# Patient Record
(demographics unavailable — no encounter records)

---

## 2017-02-21 NOTE — EDDOCDS
Physician Documentation                                                                           

[facility]                                                                         

Name: Josue Pang                                                                              

Age: 19 yrs                                                                                       

Sex: Male                                                                                         

: 1997                                                                                   

MRN: [id number]                                                                                     

Arrival Date: 2017                                                                          

Time: 09:28                                                                                       

Account#: [id number]                                                                              

Bed I7                                                                                        

Private MD: [facility] [place]                                                                          

Disposition:                                                                                      

17 12:35 Discharged to Home/Self Care. Impression: Calculus of kidney and ureter -          

Hematuria and Distal left ureteral stone on CT, Melena - None today, Nausea,                    

Generalized abdominal pain.                                                                     

- Condition is Stable.                                                                            

- Discharge Instructions: Nausea, Adult, Kidney Stones, Easy-to-Read, Gastrointestinal            

Bleeding, Easy-to-Read.                                                                         

- Prescriptions for Flomax 0.4 mg Oral Capsule, Sust. Release 24 hr - take 1 capsule by           

ORAL route once daily 1/2 hour following the same meal each day; 30 capsule. ZOFRAN             

ODT 4 mg - dissolve 1 tablet by ORAL route 4 times per day As needed do not chew, do            

not swallow whole; 10 tablet. Carafate 1 gram Oral Tablet - take 1 tablet by ORAL               

route 4 times per day take on an empty stomach, beginning on waking and last dose at            

bedtime; 100 tablet. Norco 5- 325 mg Oral Tablet - take 1 tablet by ORAL route every            

6 hours As needed MDD: 4 tabs; 20 tablet.                                                       

- Medication Reconciliation, Local Pharmacy Hours form.                                           

- Follow up: [facility] [place] [facility]; When: 1 - 2 days; Reason: Recheck today's complaints,                   

Continuance of care. Follow up: Emergency Department; Reason: Worsening of                      

conditions. Follow up: Johanna Díaz; When: Call to arrange an appointment; Reason:            

Further diagnostic work-up, Recheck today's complaints, Continuance of care. Follow             

up: Dr. Maged Saleem; When: Call to arrange an appointment; Reason: Further                 

diagnostic work-up, Recheck today's complaints, Continuance of care.                            

- Problem is new.                                                                                 

- Symptoms have improved.                                                                         

                                                                                                  

                                                                                                  

                                                                                                  

Historical:                                                                                       

- Allergies: Augmentin (Rash);                                                                    

- Home Meds:                                                                                      

1. promethazine 25 mg oral tab every 6 hours as needed                                          

2. Zantac 150 mg oral cap once daily                                                            

3. Sudafed 30 mg oral tab every 4-6 hours as needed                                             

4. Tylenol 325 mg oral tab 2 tabs every 4 hours as needed                                       

5. Zofran (as hydrochloride) 4 mg Oral tab 4 mg every 8 hours as needed                         

- PMHx: GERD;                                                                                     

- PSHx: none;                                                                                     

- Social history: Smoking status: Patient states was never smoker of tobacco. No                  

barriers to communication noted, The patient speaks fluent English.                             

- Family history: Not pertinent.                                                                  

- : The pt / caregiver states he / she is not on anticoagulants. Home medication list             

is obtained from the patient.                                                                   

- Exposure Risk Screening:: None identified.                                                      

                                                                                                  

                                                                                                  

Vital Signs:                                                                                      

                                                                                             

09:30  / 90; Pulse 88; Resp 16; Temp 96.4(T); Pulse Ox 100% on R/A; Weight 63.5 kg /    lr2 

      139.99 lbs (R); Height 5 ft. 7 in. (170.18 cm) (R); Pain 5/10;                              

12:49  / 70; Pulse 91; Resp 18; Temp 97.3(O); Pulse Ox 99% on R/A; Pain 4/10;           jml1

09:30 Body Mass Index 21.93 (63.50 kg, 170.18 cm)                                             lr2 

                                                                                                  

MDM:                                                                                              

10:07 NS 0.9% 1000 ml IV at bolus once ordered.                                               ef1 

10:07 Ondansetron 4 mg IVP once ordered.                                                      ef1 

10:07 ketorolac 30 mg IVP once ordered.                                                       ef1 

10:07 IV Saline Lock ordered.                                                                 ef1 

10:07 Undress patient appropriately for examination ordered.                                  ef1 

10:08 CT ABD & PELVIS: No Contrast Ordered.                                                   EDMS

10:09 Amylase Ordered.                                                                        EDMS

10:09 Basic Metabolic Profile Ordered.                                                        EDMS

10:09 CBC with Diff Ordered.                                                                  EDMS

10:09 Lipase Ordered.                                                                         EDMS

10:09 Liver Profile Ordered.                                                                  EDMS

10:09 Urinalysis Ordered.                                                                     EDMS

10:09 Urine Culture Ordered.                                                                  EDMS

10:09 NOTHING BY MOUTH+DIET ordered.                                                          EDMS

10:29 Financial registration complete.                                                        mm15

10:46 [facility] Payment Agreement was scanned into [employer] and attached to record.               mm15

11:17 Basic Metabolic Profile Reviewed.                                                       ef1 

11:17 CBC with Diff Reviewed.                                                                 ef1 

11:17 Liver Profile Reviewed.                                                                 ef1 

11:17 Urinalysis Reviewed.                                                                    ef1 

11:17 Amylase Reviewed.                                                                       ef1 

11:17 Lipase Reviewed.                                                                        ef1 

11:20 Gallbladder US Ordered.                                                                 EDMS

12:34 Tamsulosin Extended Release 24 hour Capsule 0.4 mg PO once ordered.                     ef1 

12:41 Ondansetron ODT Oral Disintegrating Tablet 4 mg PO once ordered.                        ef1 

12:41 HYDROcodone-acetaminophen 5 mg-325 mg 1 tabs PO once ordered.                           ef1 

                                                                                                  

Administered Medications:                                                                         

10:31 Drug: NS 0.9% 1000 ml [sodium chloride 0.9 % intravenous solution] Route: IV; Rate:     mk4 

      bolus; Site: right antecubital;                                                             

12:59 Follow up: IV Status: Completed infusion; IV Intake: 1000ml                             dsf 

10:31 Drug: Ondansetron 4 mg Route: IVP; Site: right antecubital;                             mk4 

12:01 Follow up: Response: No Adverse Reaction                                                hs1 

10:32 Drug: ketorolac 30 mg [ketorolac 30 mg/mL (1 mL) injection solution (1 mL)] Route: IVP; mk4 

      Site: right antecubital;                                                                    

12:01 Follow up: Response: Pain is decreased                                                  hs1 

12:57 Drug: Tamsulosin 0.4 mg [tamsulosin 0.4 mg capsule (1 caps)] Route: PO;                 dsf 

12:57 Drug: Ondansetron ODT 4 mg [ondansetron 4 mg disintegrating tablet (1 tabs)] Route: PO; dsf 

12:57 Drug: HYDROcodone-acetaminophen 1 tabs [hydrocodone 5 mg-acetaminophen 325 mg tablet (1 dsf 

      tabs)] Route: PO;                                                                           

                                                                                                  

                                                                                                  

Signatures:                                                                                       

Dispatcher [employer]                           Dipika Ma RN RN kcs Feola, Erica, PA-C                      PA-C ef1                                                  

Hattie Chance RN RN   dsf                                                  

Jaguar Gibson                             mm15                                                 

Constance Dowd RN                      RN   mk4                                                  

Rema Browne RN   hs1                                                  

                                                                                                  

The chart was reviewed and I authenticate all verbal orders and agree with the evaluation and 
treatment provided.Attachments:

10:46 [facility] Payment Agreement                                                                mm15

                                                                                                  

**************************************************************************************************

MTDD

## 2017-02-21 NOTE — EDDOCDS
Nurse's Notes                                                                                     

[facility]                                                                         

Name: Josue Pang                                                                              

Age: 19 yrs                                                                                       

Sex: Male                                                                                         

: 1997                                                                                   

MRN: [id number]                                                                                     

Arrival Date: 2017                                                                          

Time: 09:28                                                                                       

Account#: [id number]                                                                              

Bed I7 / 29                                                                                       

Private MD: KERRIE Gaston                                                                          

Diagnosis: Calculus of kidney and ureter-Hematuria and Distal left ureteral stone on              

CT;Melena-None today;Nausea;Generalized abdominal pain                                          

                                                                                                  

Presentation:                                                                                     

                                                                                             

09:45 Presenting complaint: Patient states: he has had abdominal pain since last Thursday -   kcs 

      went to sick call then - also had black stools then but he was just put on quarters and     

      thinks it tested negative for blood - still with abdominal pain plus now has extreme        

      fatigue, headache and his back hurts. Risk factors: the patient reports not having a        

      history of previous torsion. Adult Sepsis Screening: The patient does not have new or       

      worsening altered mentation. Patient's respiratory rate is less than 22. Systolic blood     

      pressure is greater than 100. Patient has a qSOFA score of 0- Negative Sepsis Screen.       

      Suicide/Homicide risk assessment- the patient denies having any suicidal and/or             

      homicidal ideations and does not present with any other emotional, behavioral or mental     

      health complaints.  Status: The patient is an active duty .           

      Transition of care: Patient was received from [facility].                   

09:45 Acuity: RICK Level 3                                                                     kcs 

09:45 Method Of Arrival: Walkin/Carried/Asstd                                                 kcs 

                                                                                                  

Triage Assessment:                                                                                

09:50 General: Appears comfortable, well developed, well nourished, well groomed, Behavior is kcs 

      cooperative, flat. Pain: Location: abdomen Pain currently is 5 out of 10 on a pain          

      scale. 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 Neurological: Level of         

      Consciousness is awake, alert. Respiratory: Airway is patent Respiratory effort is          

      even, unlabored, Respiratory pattern is regular, symmetrical. GI: Reports nausea,           

      Denies diarrhea, vomiting. Derm: Skin is intact, is healthy with good turgor, Skin is       

      dry, Skin is normal.                                                                        

                                                                                                  

Historical:                                                                                       

- Allergies: Augmentin (Rash);                                                                    

- Home Meds:                                                                                      

1. promethazine 25 mg oral tab every 6 hours as needed                                          

2. Zantac 150 mg oral cap once daily                                                            

3. Sudafed 30 mg oral tab every 4-6 hours as needed                                             

4. Tylenol 325 mg oral tab 2 tabs every 4 hours as needed                                       

5. Zofran (as hydrochloride) 4 mg Oral tab 4 mg every 8 hours as needed                         

- PMHx: GERD;                                                                                     

- PSHx: none;                                                                                     

- Social history: Smoking status: Patient states was never smoker of tobacco. No                  

barriers to communication noted, The patient speaks fluent English.                             

- Family history: Not pertinent.                                                                  

- : The pt / caregiver states he / she is not on anticoagulants. Home medication list             

is obtained from the patient.                                                                   

- Exposure Risk Screening:: None identified.                                                      

                                                                                                  

                                                                                                  

Screening:                                                                                        

10:10 Screening information is obtained from the patient. Fall risk: No risks identified.     mk4 

      Assistance ADL's: requires no assistance with activities of daily living. Abuse/DV          

      Screen: The patient / caregiver reports he/she is: not in a situation that causes fear,     

      pain or injury. Nutritional screening: No deficits noted. Advance Directives:               

      Currently, there is no health care proxy. There is no active DNR order. There is no         

      living will. There is no Power of . Advance directive information has not           

      previously been placed in an [facility] medical record. Further advance directive information      

      is declined. home support is adequate.                                                      

                                                                                                  

Assessment:                                                                                       

10:10 General: Appears uncomfortable, Behavior is cooperative. Pain: Location: right lower    mk4 

      quadrant and left lower quadrant. Neurological: Level of Consciousness is awake, alert.     

      Respiratory: Airway is patent Respiratory effort is even, unlabored, Respiratory            

      pattern is regular, Breath sounds are clear bilaterally. GI: Abdomen is flat, non-          

      distended Bowel sounds present X 4 quads. Abd is tender to palpation in right lower         

      quadrant and left lower quadrant. Derm: Skin is intact, is healthy with good turgor,        

      Skin is pink, warm & dry.                                                                 

12:02 General: Appears in no apparent distress, comfortable, Behavior is appropriate for age, hs1 

      cooperative, patient resting at this time with no needs. Patient states pain decreased      

      and that he is feeling better. .                                                            

12:58 General: Appears in no apparent distress, Behavior is appropriate for age, cooperative. dsf 

      Neurological: Level of Consciousness is awake, alert. Cardiovascular: Capillary refill      

      < 3 seconds. Respiratory: Airway is patent Respiratory effort is even, unlabored,           

      Respiratory pattern is regular, symmetrical. Derm: Skin is pink, warm & dry.              

                                                                                                  

Vital Signs:                                                                                      

09:30  / 90; Pulse 88; Resp 16; Temp 96.4(T); Pulse Ox 100% on R/A; Weight 63.5 kg (R); lr2 

      Height 5 ft. 7 in. (170.18 cm) (R); Pain 5/10;                                              

12:49  / 70; Pulse 91; Resp 18; Temp 97.3(O); Pulse Ox 99% on R/A; Pain 4/10;           jml1

09:30 Body Mass Index 21.93 (63.50 kg, 170.18 cm)                                             lr2 

                                                                                                  

Vitals:                                                                                           

09:30 Log In Time: 2017 at 09:28.                                                lr2 

                                                                                                  

ED Course:                                                                                        

09:30 Patient visited by Rochelle Connolly.                                                         lr2 

09:30 Patient moved to Waiting                                                                lr2 

09:31 SAUNDRA Gaston is Private Physician.                                                      lr2 

09:32 Patient moved to Pre RCE                                                                lr2 

09:37 [facility]                                                             ef1 

09:37 Chaparrita Shah MD is Attending Physician.                                      ef1 

09:47 Triage Initiated                                                                        kcs 

09:52 Patient visited by Jaycee Carrillo PA-C.                                                  ef1 

09:52 Patient moved to Triage 1                                                               kcs 

10:09 Patient moved to I1 / M1                                                                srm 

10:09 Patient moved to I7 / 29                                                                srm 

10:10 The patient / caregiver is instructed regarding the plan of care and ED course.         mk4 

10:10 Inserted saline lock: 20 gauge in right antecubital area and blood collected.           mk4 

10:28 Patient visited by Jaycee Carrillo PA-C.                                                  ef1 

10:46 [facility] Payment Agreement was scanned into [employer] and attached to record.               mm15

10:59 Patient name changed from Josue\S\\S\Pang\S\ to Josue\S\ \S\Pang.                      
   EDMS

11:02 Patient visited by Constance Dowd RN.                                                  mk4 

11:21 Patient visited by Jaycee Carrillo PA-C.                                                  ef1 

11:39 CT ABD & PELVIS: No Contrast Returned.                                                  EDMS

11:49 Patient visited by Jaycee Carrillo PA-C.                                                  ef1 

12:24 Patient visited by Constance Dowd RN.                                                  mk4 

12:25 Gallbladder US Returned.                                                                EDMS

12:35 Patient visited by Jaycee Carrillo PA-C.                                                  ef1 

12:35 KERRIE Gaston is Referral Physician.                                                     ef1 

12:35 Johanna Díaz MD is Referral Physician.                                              ef1 

12:35 Maged Saleem MD is Referral Physician.                                            ef1 

12:50 Patient visited by David John.                                                       jml1

12:58 Discontinued lock intact, bleeding controlled, pressure dressing applied, No            dsf 

      redness/swelling at site. No procedures done that require assistance.                       

                                                                                                  

Administered Medications:                                                                         

10:31 Drug: NS 0.9% 1000 ml [sodium chloride 0.9 % intravenous solution] Route: IV; Rate:     mk4 

      bolus; Site: right antecubital;                                                             

12:59 Follow up: IV Status: Completed infusion; IV Intake: 1000ml                             dsf 

10:31 Drug: Ondansetron 4 mg Route: IVP; Site: right antecubital;                             mk4 

12:01 Follow up: Response: No Adverse Reaction                                                hs1 

10:32 Drug: ketorolac 30 mg [ketorolac 30 mg/mL (1 mL) injection solution (1 mL)] Route: IVP; mk4 

      Site: right antecubital;                                                                    

12:01 Follow up: Response: Pain is decreased                                                  hs1 

12:57 Drug: Tamsulosin 0.4 mg [tamsulosin 0.4 mg capsule (1 caps)] Route: PO;                 dsf 

12:57 Drug: Ondansetron ODT 4 mg [ondansetron 4 mg disintegrating tablet (1 tabs)] Route: PO; dsf 

12:57 Drug: HYDROcodone-acetaminophen 1 tabs [hydrocodone 5 mg-acetaminophen 325 mg tablet (1 dsf 

      tabs)] Route: PO;                                                                           

                                                                                                  

                                                                                                  

Intake:                                                                                           

12:59 IV: 1000.00ml; Total: 1000.00ml.                                                        dsf 

                                                                                                  

Order Results:                                                                                    

Lab Order: Amylase; SPEC'M 17 10:22                                                         

      Test: AMYLASE; Value: 52; Range: ; Units: U/L; Status: F                              

Lab Order: Basic Metabolic Profile; SPEC'M 17 10:22                                         

      Test: GLUCOSE, FASTING; Value: 92; Range: ; Units: MG/DL; Status: F                   

      Test: BLOOD UREA NITROGEN; Value: 12; Range: 7-18; Units: MG/DL; Status: F                  

      Test: CREATININE FOR GFR; Value: 1.37; Range: 0.70-1.30; Abnormal: Above high normal;       

      Units: MG/DL; Status: F                                                                     

      Test: SODIUM LEVEL; Value: 141; Range: 136-145; Units: MEQ/L; Status: F                     

      Test: POTASSIUM SERUM; Value: 3.5; Range: 3.5-5.1; Units: MEQ/L; Status: F                  

      Test: CHLORIDE LEVEL; Value: 103; Range: ; Units: MEQ/L; Status: F                    

      Test: CARBON DIOXIDE LEVEL; Value: 30; Range: 21-32; Units: MEQ/L; Status: F                

      Test: ANION GAP; Value: 8; Range: 8-16; Units: MEQ/L; Status: F                             

      Test: CALCIUM LEVEL; Value: 9.2; Range: 8.5-10.1; Units: MG/DL; Status: F                   

Lab Order: CBC with Diff; SPEC'M 17 10:22                                                   

      Test: WHITE BLOOD COUNT; Value: 7.3; Range: 4.0-10.0; Units: K/mm3; Status: F               

      Test: RED BLOOD COUNT; Value: 6.07; Range: 4.30-6.10; Units: M/mm3; Status: F               

      Test: HEMOGLOBIN; Value: 18.2; Range: 14.0-18.0; Abnormal: Above high normal; Units:        

      g/dl; Status: F                                                                             

      Test: HEMATOCRIT; Value: 50.4; Range: 42.0-52.0; Units: %; Status: F                        

      Test: MEAN CORPUSCULAR VOLUME; Value: 83.0; Range: 80.0-96.0; Units: fl; Status: F          

      Test: MEAN CORPUSCULAR HEMOGLOBIN; Value: 29.9; Range: 27.0-33.0; Units: pg; Status: F      

      Test: MEAN CORPUSCULAR HGB CONC; Value: 36.0; Range: 32.0-36.5; Units: g/dl; Status: F      

      Test: RED CELL DISTRIBUTION WIDTH; Value: 12.0; Range: 11.5-14.5; Units: %; Status: F       

      Test: PLATELET COUNT, AUTOMATED; Value: 136; Range: 150-450; Abnormal: Below low            

      normal; Units: k/mm3; Status: F                                                             

      Test: NEUTROPHILS %; Value: 74.0; Range: 36.0-66.0; Abnormal: Above high normal; Units:     

      %; Status: F                                                                                

      Test: LYMPH %; Value: 16.9; Range: 24.0-44.0; Abnormal: Below low normal; Units: %;         

      Status: F                                                                                   

      Test: MONO %; Value: 6.0; Range: 0.0-5.0; Abnormal: Above high normal; Units: %;            

      Status: F                                                                                   

      Test: EOS %; Value: 0.7; Range: 0.0-3.0; Units: %; Status: F                                

      Test: BASO %; Value: 0.4; Range: 0.0-1.0; Units: %; Status: F                               

      Test: LARGE UNSTAINED CELL %; Value: 2.0; Range: 0.0-4.0; Units: %; Status: F               

      Test: NEUTROPHILS #; Value: 5.4; Range: 1.8-7.7; Units: K/mm3; Status: F                    

      Test: LYMPH #; Value: 1.2; Range: 1.5-6.5; Abnormal: Below low normal; Units: K/mm3;        

      Status: F                                                                                   

      Test: MONO #; Value: 0.4; Range: 0.0-0.8; Units: K/mm3; Status: F                           

      Test: EOS #; Value: 0.0; Range: 0.0-0.50; Units: K/mm3; Status: F                           

      Test: BASO #; Value: 0.0; Range: 0.0-0.2; Units: K/mm3; Status: F                           

      Test: LARGE UNSTAINED CELL #; Value: 0.2; Range: 0.0-0.4; Units: K/mm3; Status: F           

Lab Order: Lipase; SPEC'M 17 10:22                                                          

      Test: LIPASE; Value: 115; Range: ; Units: U/L; Status: F                              

Lab Order: Liver Profile; SPEC'M 17 10:22                                                   

      Test: AST/SGOT; Value: 16; Range: 15-37; Units: U/L; Status: F                              

      Test: ALT/SGPT; Value: 20; Range: 12-78; Units: U/L; Status: F                              

      Test: ALKALINE PHOSPHATASE; Value: 107; Range: ; Units: U/L; Status: F                

      Test: BILIRUBIN,TOTAL; Value: 2.6; Range: 0.2-1.0; Abnormal: Above high normal; Units:      

      MG/DL; Status: F                                                                            

      Test: BILIRUBIN,DIRECT; Value: 0.3; Range: 0.0-0.2; Abnormal: Above high normal; Units:     

      MG/DL; Status: F                                                                            

      Test: TOTAL PROTEIN; Value: 8.2; Range: 6.4-8.2; Units: GM/DL; Status: F                    

      Test: ALBUMIN; Value: 4.6; Range: 3.2-5.2; Units: GM/DL; Status: F                          

      Test: ALBUMIN/GLOBULIN RATIO; Value: 1.28; Range: 1.00-1.93; Status: F                      

Lab Order: Urinalysis; SPEC'M 17 10:22                                                      

      Test: APPEARANCE, URINE; Value: CLEAR; Range: CLEAR; Status: F                              

      Test: COLOR, URINE; Value: YELLOW; Range: YELLOW; Status: F                                 

      Test: PH,URINE; Value: 6.0; Range: 5.0-9.0; Units: UNITS; Status: F                         

      Test: SPECIFIC GRAVITY URINE AUTO; Value: 1.020; Range: 1.002-1.035; Status: F              

      Test: PROTEIN, URINE AUTO; Value: NEGATIVE; Range: NEGATIVE; Units: mg/dL; Status: F        

      Test: GLUCOSE, URINE (UA) AUTO; Value: NEGATIVE; Range: NEGATIVE; Units: mg/dL; Status:     

      F                                                                                           

      Test: KETONE, URINE AUTO; Value: NEGATIVE; Range: NEGATIVE; Units: mg/dL; Status: F         

      Test: UROBILINOGEN, URINE AUTO; Value: 0.2; Range: 0.0-2.0; Units: mg/dL; Status: F         

      Test: BILIRUBIN, URINE AUTO; Value: NEGATIVE; Range: NEGATIVE; Status: F                    

      Test: NITRITE, URINE AUTO; Value: NEGATIVE; Range: NEGATIVE; Status: F                      

      Test: LEUKOCYTE ESTERASE, URINE AUTO; Value: NEGATIVE; Range: NEGATIVE; Status: F           

      Test: BLOOD, URINE BLOOD; Value: 1+; Range: NEGATIVE; Abnormal: Above high normal;          

      Status: F                                                                                   

      Test: WBC, URINE AUTO; Value: 0; Range: 0-3; Units: /HPF; Status: F                         

      Test: RBC, URINE AUTO; Value: 4; Range: 0-3; Abnormal: Above high normal; Units: /HPF;      

      Status: F                                                                                   

      Test: BACTERIA, URINE AUTO; Value: NEGATIVE; Range: NEGATIVE; Status: F                     

      Test: SQUAMOUS EPITHELIAL CELL UR AU; Value: 0; Range: 0-6; Units: /HPF; Status: F          

      Test: MUCUS, URINE; Value: SMALL; Range: NEGATIVE; Status: F                                

      Test: HYALINE CAST, URINE AUTO; Value: 0; Range: 0-1; Units: /LPF; Status: F                

                                                                                                  

Radiology Order: CT ABD & PELVIS: No Contrast                                                   

      Test: CT ABD & PELVIS: No Contrast                                                        

      REASON FOR EXAMINATION: abd pain/black stools/flank pain; Clinical: Generalized             

      abdominal pain.; ; Findings:; Lung bases clear. Visualized heart and pericardium            

      normal.; ; Liver, spleen, pancreas, gallbladder, bilateral adrenal glands and kidneys       

      are; normal for noncontrast evaluation. Evaluation of the enteric system is limited; by     

      paucity of intraperitoneal fat as well as lack of intraluminal contrast. No;                

      obstruction or obvious acute inflammatory process. Visualized portions of the; appendix     

      appear normal although incompletely evaluated. Pelvis demonstrates; normal bladder and      

      age appropriate prostate/seminal vesicles. 3 mm calcification; within the deep left         

      bhakti pelvis likely represents phlebolith and less likely; ureteral calculus. No             

      ascites. No free air. No obvious adenopathy. Abdominal; aorta without aneurysm.             

      Musculoskeletal structures intact.; ; Impression:; Limited examination.; No obvious         

      acute intra-abdominal or pelvic pathology.; 3 mm calcification in the left bhakti pelvis      

      likely phleboliths although; correlation with physical examination and urinalysis is        

      recommended to better; exclude the possibility of distal left ureteral stone.; ; ;          

      Signed by; Camacho Avalos MD 2017 11:00 A;                                            

Radiology Order: Gallbladder US                                                                   

      Test: Gallbladder US                                                                        

      REASON FOR EXAMINATION: Biliary Colic; Clinical: Biliary colic.; ; Technique: Real time     

      gray scale and color evaluation using curved array; transducer.; ; Findings:; The liver     

      and pancreas are normal in contour, size, echogenicity without focal; hepatic or            

      pancreatic lesions identified. The gallbladder is normal and without; gallstones, wall      

      thickening or pericholecystic fluid. No biliary ductal; dilatation is appreciated and       

      the common bile duct measures 2.7 mm diameter. The; right kidney is normal in reniform      

      shape with extrarenal pelvis and measures 9.7; x 3.9 x 5.2 cm. No ascites in the            

      visualized right upper quadrant.; ; Impression:; Normal RUQ abdominal ultrasound.; ; ;      

      Signed by; Camacho Avalos MD 2017 12:04 P;                                            

Outcome:                                                                                          

12:35 Discharge ordered by Provider.                                                          ef1 

12:58 Discharge Assessment: Patient awake, alert and oriented x 3. No cognitive and/or        dsf 

      functional deficits noted. Patient verbalized understanding of disposition                  

      instructions. patient administered narcotics - yes. Pt provided with safe discharge.        

      The following High Risk Discharge criteria are identified: None. Discharged to home         

      ambulatory. Condition: stable. Discharge instructions given to patient, Instructed on       

      discharge instructions, follow up and referral plans. medication usage, no driving          

      heavy equipment, Demonstrated understanding of instructions, medications, Pt was            

      receptive of discharge instructions/ teaching. Prescriptions given X 3. CT Study            

      completed. Ultrasound Study completed. Property sent home with patient.                     

12:59 Patient left the ED.                                                                    dsf 

                                                                                                  

Signatures:                                                                                       

Dispatcher [employer]                           EDDipika Otero, RN                      RN   Lelo Mccullough RN                    RN   Jaycee Martin, PA-C                      PA-C ef1                                                  

Rema Browne RN                    RN   hs1                                                  

Hattie Chance,RN                       RN   dsf                                                  

David John                                jml1                                                 

Jaguar Gibson                             mm15                                                 

Constance Dowd RN                      RN   mk4                                                  

Rochelle Connolly                                  lr2                                                  

                                                                                                  

**************************************************************************************************

MTDD

## 2017-02-21 NOTE — REP
Clinical:  Generalized abdominal pain.

 

Findings:

Lung bases clear.  Visualized heart and pericardium normal.

 

Liver, spleen, pancreas, gallbladder, bilateral adrenal glands and kidneys are

normal for noncontrast evaluation.  Evaluation of the enteric system is limited

by paucity of intraperitoneal fat as well as lack of intraluminal contrast.  No

obstruction or obvious acute inflammatory process.  Visualized portions of the

appendix appear normal although incompletely evaluated.  Pelvis demonstrates

normal bladder and age appropriate prostate/seminal vesicles.  3 mm calcification

within the deep left bhakti pelvis likely represents phlebolith and less likely

ureteral calculus.  No ascites.  No free air.  No obvious adenopathy.  Abdominal

aorta without aneurysm.  Musculoskeletal structures intact.

 

Impression:

Limited examination.

No obvious acute intra-abdominal or pelvic pathology.

3 mm calcification in the left bhakti pelvis likely phleboliths although

correlation with physical examination and urinalysis is recommended to better

exclude the possibility of distal left ureteral stone.

 

 

Signed by

Camacho Avalos MD 02/21/2017 11:00 A

## 2017-02-21 NOTE — REP
Clinical:  Biliary colic.

 

Technique:  Real time gray scale and color evaluation using curved array

transducer.

 

Findings:

The liver and pancreas are normal in contour, size, echogenicity without focal

hepatic or pancreatic lesions identified.  The gallbladder is normal and without

gallstones, wall thickening or pericholecystic fluid.  No biliary ductal

dilatation is appreciated and the common bile duct measures 2.7 mm diameter.  The

right kidney is normal in reniform shape with extrarenal pelvis and measures 9.7

x 3.9 x 5.2 cm.  No ascites in the visualized right upper quadrant.

 

Impression:

Normal RUQ abdominal ultrasound.

 

 

Signed by

Camacho Avalos MD 02/21/2017 12:04 P

## 2017-02-23 NOTE — EDDOCDS
Physician Documentation                                                                           

[facility]                                                                         

Name: Josue Pang                                                                              

Age: 19 yrs                                                                                       

Sex: Male                                                                                         

: 1997                                                                                   

MRN: [id number]                                                                                     

Arrival Date: 2017                                                                          

Time: 09:28                                                                                       

Account#: [id number]                                                                              

Bed I7                                                                                        

Private MD: [facility]                                                                          

Disposition:                                                                                      

17 12:35 Discharged to Home/Self Care. Impression: Calculus of kidney and ureter -          

Hematuria and Distal left ureteral stone on CT, Melena - None today, Nausea,                    

Generalized abdominal pain.                                                                     

- Condition is Stable.                                                                            

- Discharge Instructions: Nausea, Adult, Kidney Stones, Easy-to-Read, Gastrointestinal            

Bleeding, Easy-to-Read.                                                                         

- Prescriptions for Flomax 0.4 mg Oral Capsule, Sust. Release 24 hr - take 1 capsule by           

ORAL route once daily 1/2 hour following the same meal each day; 30 capsule. ZOFRAN             

ODT 4 mg - dissolve 1 tablet by ORAL route 4 times per day As needed do not chew, do            

not swallow whole; 10 tablet. Carafate 1 gram Oral Tablet - take 1 tablet by ORAL               

route 4 times per day take on an empty stomach, beginning on waking and last dose at            

bedtime; 100 tablet. Norco 5- 325 mg Oral Tablet - take 1 tablet by ORAL route every            

6 hours As needed MDD: 4 tabs; 20 tablet.                                                       

- Medication Reconciliation, Local Pharmacy Hours form.                                           

- Follow up: [facility]; When: 1 - 2 days; Reason: Recheck today's complaints,                   

Continuance of care. Follow up: Emergency Department; Reason: Worsening of                      

conditions. Follow up: Johanna D[facility]az; When: Call to arrange an appointment; Reason:            

Further diagnostic work-up, Recheck today's complaints, Continuance of care. Follow             

up: Dr. Maged Saleem; When: Call to arrange an appointment; Reason: Further                 

diagnostic work-up, Recheck today's complaints, Continuance of care.                            

- Problem is new.                                                                                 

- Symptoms have improved.                                                                         

                                                                                                  

                                                                                                  

                                                                                                  

Historical:                                                                                       

- Allergies: Augmentin (Rash);                                                                    

- Home Meds:                                                                                      

1. promethazine 25 mg oral tab every 6 hours as needed                                          

2. Zantac 150 mg oral cap once daily                                                            

3. Sudafed 30 mg oral tab every 4-6 hours as needed                                             

4. Tylenol 325 mg oral tab 2 tabs every 4 hours as needed                                       

5. Zofran (as hydrochloride) 4 mg Oral tab 4 mg every 8 hours as needed                         

- PMHx: GERD;                                                                                     

- PSHx: none;                                                                                     

- Social history: Smoking status: Patient states was never smoker of tobacco. No                  

barriers to communication noted, The patient speaks fluent English.                             

- Family history: Not pertinent.                                                                  

- : The pt / caregiver states he / she is not on anticoagulants. Home medication list             

is obtained from the patient.                                                                   

- Exposure Risk Screening:: None identified.                                                      

                                                                                                  

                                                                                                  

Vital Signs:                                                                                      

                                                                                             

09:30  / 90; Pulse 88; Resp 16; Temp 96.4(T); Pulse Ox 100% on R/A; Weight 63.5 kg /    lr2 

      139.99 lbs (R); Height 5 ft. 7 in. (170.18 cm) (R); Pain 5/10;                              

12:49  / 70; Pulse 91; Resp 18; Temp 97.3(O); Pulse Ox 99% on R/A; Pain 4/10;           jml1

09:30 Body Mass Index 21.93 (63.50 kg, 170.18 cm)                                             lr2 

                                                                                                  

MDM:                                                                                              

10:07 NS 0.9% 1000 ml IV at bolus once ordered.                                               ef1 

10:07 Ondansetron 4 mg IVP once ordered.                                                      ef1 

10:07 ketorolac 30 mg IVP once ordered.                                                       ef1 

10:07 IV Saline Lock ordered.                                                                 ef1 

10:07 Undress patient appropriately for examination ordered.                                  ef1 

10:08 CT ABD & PELVIS: No Contrast Ordered.                                                   EDMS

10:09 Amylase Ordered.                                                                        EDMS

10:09 Basic Metabolic Profile Ordered.                                                        EDMS

10:09 CBC with Diff Ordered.                                                                  EDMS

10:09 Lipase Ordered.                                                                         EDMS

10:09 Liver Profile Ordered.                                                                  EDMS

10:09 Urinalysis Ordered.                                                                     EDMS

10:09 Urine Culture Ordered.                                                                  EDMS

10:09 NOTHING BY MOUTH+DIET ordered.                                                          EDMS

10:29 Financial registration complete.                                                        mm15

10:46 [facility] Payment Agreement was scanned into [employer] and attached to record.               mm15

11:17 Basic Metabolic Profile Reviewed.                                                       ef1 

11:17 CBC with Diff Reviewed.                                                                 ef1 

11:17 Liver Profile Reviewed.                                                                 ef1 

11:17 Urinalysis Reviewed.                                                                    ef1 

11:17 Amylase Reviewed.                                                                       ef1 

11:17 Lipase Reviewed.                                                                        ef1 

11:20 Gallbladder US Ordered.                                                                 EDMS

12:34 Tamsulosin Extended Release 24 hour Capsule 0.4 mg PO once ordered.                     ef1 

12:41 Ondansetron ODT Oral Disintegrating Tablet 4 mg PO once ordered.                        ef1 

12:41 HYDROcodone-acetaminophen 5 mg-325 mg 1 tabs PO once ordered.                           ef1 

                                                                                             

11:20 T-Sheet-- Draft Copy was scanned into [employer] and attached to record.                   gb  

11:21 Radiology Report was scanned into [employer] and attached to record.                       gb  

                                                                                                  

Administered Medications:                                                                         

                                                                                             

10:31 Drug: NS 0.9% 1000 ml [sodium chloride 0.9 % intravenous solution] Route: IV; Rate:     mk4 

      bolus; Site: right antecubital;                                                             

12:59 Follow up: IV Status: Completed infusion; IV Intake: 1000ml                             dsf 

10:31 Drug: Ondansetron 4 mg Route: IVP; Site: right antecubital;                             mk4 

12:01 Follow up: Response: No Adverse Reaction                                                hs1 

10:32 Drug: ketorolac 30 mg [ketorolac 30 mg/mL (1 mL) injection solution (1 mL)] Route: IVP; mk4 

      Site: right antecubital;                                                                    

12:01 Follow up: Response: Pain is decreased                                                  hs1 

12:57 Drug: Tamsulosin 0.4 mg [tamsulosin 0.4 mg capsule (1 caps)] Route: PO;                 dsf 

12:57 Drug: Ondansetron ODT 4 mg [ondansetron 4 mg disintegrating tablet (1 tabs)] Route: PO; dsf 

12:57 Drug: HYDROcodone-acetaminophen 1 tabs [hydrocodone 5 mg-acetaminophen 325 mg tablet (1 dsf 

      tabs)] Route: PO;                                                                           

                                                                                                  

                                                                                                  

Signatures:                                                                                       

Dispatcher [employer]                           Dipika Ma RN                      RN   kcs                                                  

Audrey Stoddard, Reg                  Reg  gb                                                   

Jaycee Carrillo, PA-C                      PA-C ef1                                                  

Hattie Chance RN                       RN   dsf                                                  

Jaguar Gibson                             mm15                                                 

Constance Dowd RN RN   mk4                                                  

Rema Browne RN   hs1                                                  

                                                                                                  

The chart was reviewed and I authenticate all verbal orders and agree with the evaluation and 
treatment provided.Attachments:

10:46 [facility] Payment Agreement                                                                mm15

                                                                                             

11:20 T-Sheet-- Draft Copy                                                                      

                                                                                                  

**************************************************************************************************



*** Chart Complete ***
MTDD

## 2017-02-23 NOTE — EDDOCDS
Nurse's Notes                                                                                     

[facility]                                                                         

Name: Josue Pang                                                                              

Age: 19 yrs                                                                                       

Sex: Male                                                                                         

: 1997                                                                                   

MRN: [id number]                                                                                     

Arrival Date: 2017                                                                          

Time: 09:28                                                                                       

Account#: [id number]                                                                              

Bed I7 / 29                                                                                       

Private MD: KERRIE Gaston                                                                          

Diagnosis: Calculus of kidney and ureter-Hematuria and Distal left ureteral stone on              

CT;Melena-None today;Nausea;Generalized abdominal pain                                          

                                                                                                  

Presentation:                                                                                     

                                                                                             

09:45 Presenting complaint: Patient states: he has had abdominal pain since last Thursday -   kcs 

      went to sick call then - also had black stools then but he was just put on quarters and     

      thinks it tested negative for blood - still with abdominal pain plus now has extreme        

      fatigue, headache and his back hurts. Risk factors: the patient reports not having a        

      history of previous torsion. Adult Sepsis Screening: The patient does not have new or       

      worsening altered mentation. Patient's respiratory rate is less than 22. Systolic blood     

      pressure is greater than 100. Patient has a qSOFA score of 0- Negative Sepsis Screen.       

      Suicide/Homicide risk assessment- the patient denies having any suicidal and/or             

      homicidal ideations and does not present with any other emotional, behavioral or mental     

      health complaints.  Status: The patient is an active duty .           

      Transition of care: Patient was received from [facility].                   

09:45 Acuity: RICK Level 3                                                                     kcs 

09:45 Method Of Arrival: Walkin/Carried/Asstd                                                 kcs 

                                                                                                  

Triage Assessment:                                                                                

09:50 General: Appears comfortable, well developed, well nourished, well groomed, Behavior is kcs 

      cooperative, flat. Pain: Location: abdomen Pain currently is 5 out of 10 on a pain          

      scale. 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 Neurological: Level of         

      Consciousness is awake, alert. Respiratory: Airway is patent Respiratory effort is          

      even, unlabored, Respiratory pattern is regular, symmetrical. GI: Reports nausea,           

      Denies diarrhea, vomiting. Derm: Skin is intact, is healthy with good turgor, Skin is       

      dry, Skin is normal.                                                                        

                                                                                                  

Historical:                                                                                       

- Allergies: Augmentin (Rash);                                                                    

- Home Meds:                                                                                      

1. promethazine 25 mg oral tab every 6 hours as needed                                          

2. Zantac 150 mg oral cap once daily                                                            

3. Sudafed 30 mg oral tab every 4-6 hours as needed                                             

4. Tylenol 325 mg oral tab 2 tabs every 4 hours as needed                                       

5. Zofran (as hydrochloride) 4 mg Oral tab 4 mg every 8 hours as needed                         

- PMHx: GERD;                                                                                     

- PSHx: none;                                                                                     

- Social history: Smoking status: Patient states was never smoker of tobacco. No                  

barriers to communication noted, The patient speaks fluent English.                             

- Family history: Not pertinent.                                                                  

- : The pt / caregiver states he / she is not on anticoagulants. Home medication list             

is obtained from the patient.                                                                   

- Exposure Risk Screening:: None identified.                                                      

                                                                                                  

                                                                                                  

Screening:                                                                                        

10:10 Screening information is obtained from the patient. Fall risk: No risks identified.     mk4 

      Assistance ADL's: requires no assistance with activities of daily living. Abuse/DV          

      Screen: The patient / caregiver reports he/she is: not in a situation that causes fear,     

      pain or injury. Nutritional screening: No deficits noted. Advance Directives:               

      Currently, there is no health care proxy. There is no active DNR order. There is no         

      living will. There is no Power of . Advance directive information has not           

      previously been placed in an [facility] medical record. Further advance directive information      

      is declined. home support is adequate.                                                      

                                                                                                  

Assessment:                                                                                       

10:10 General: Appears uncomfortable, Behavior is cooperative. Pain: Location: right lower    mk4 

      quadrant and left lower quadrant. Neurological: Level of Consciousness is awake, alert.     

      Respiratory: Airway is patent Respiratory effort is even, unlabored, Respiratory            

      pattern is regular, Breath sounds are clear bilaterally. GI: Abdomen is flat, non-          

      distended Bowel sounds present X 4 quads. Abd is tender to palpation in right lower         

      quadrant and left lower quadrant. Derm: Skin is intact, is healthy with good turgor,        

      Skin is pink, warm & dry.                                                                 

12:02 General: Appears in no apparent distress, comfortable, Behavior is appropriate for age, hs1 

      cooperative, patient resting at this time with no needs. Patient states pain decreased      

      and that he is feeling better. .                                                            

12:58 General: Appears in no apparent distress, Behavior is appropriate for age, cooperative. dsf 

      Neurological: Level of Consciousness is awake, alert. Cardiovascular: Capillary refill      

      < 3 seconds. Respiratory: Airway is patent Respiratory effort is even, unlabored,           

      Respiratory pattern is regular, symmetrical. Derm: Skin is pink, warm & dry.              

                                                                                                  

Vital Signs:                                                                                      

09:30  / 90; Pulse 88; Resp 16; Temp 96.4(T); Pulse Ox 100% on R/A; Weight 63.5 kg (R); lr2 

      Height 5 ft. 7 in. (170.18 cm) (R); Pain 5/10;                                              

12:49  / 70; Pulse 91; Resp 18; Temp 97.3(O); Pulse Ox 99% on R/A; Pain 4/10;           jml1

09:30 Body Mass Index 21.93 (63.50 kg, 170.18 cm)                                             lr2 

                                                                                                  

Vitals:                                                                                           

09:30 Log In Time: 2017 at 09:28.                                                lr2 

                                                                                                  

ED Course:                                                                                        

09:30 Patient visited by Rochelle Connolly.                                                         lr2 

09:30 Patient moved to Waiting                                                                lr2 

09:31 SAUNDRA Gaston is Private Physician.                                                      lr2 

09:32 Patient moved to Pre RCE                                                                lr2 

09:37 [facility]                                                             ef1 

09:37 Chaparrita Shah MD is Attending Physician.                                      ef1 

09:47 Triage Initiated                                                                        kcs 

09:52 Patient visited by Jaycee Carrillo PA-C.                                                  ef1 

09:52 Patient moved to Triage 1                                                               kcs 

10:09 Patient moved to I1 / M1                                                                srm 

10:09 Patient moved to I7 / 29                                                                srm 

10:10 The patient / caregiver is instructed regarding the plan of care and ED course.         mk4 

10:10 Inserted saline lock: 20 gauge in right antecubital area and blood collected.           mk4 

10:28 Patient visited by Jaycee Carrillo PA-C.                                                  ef1 

10:46 [facility] Payment Agreement was scanned into [employer] and attached to record.               mm15

10:59 Patient name changed from Josue\S\\S\Pang\S\ to Josue\S\ \S\Pang.                      
   EDMS

11:02 Patient visited by Constance Dowd RN.                                                  mk4 

11:21 Patient visited by Jaycee Carrillo PA-C.                                                  ef1 

11:39 CT ABD & PELVIS: No Contrast Returned.                                                  EDMS

11:49 Patient visited by Jaycee Carrillo PA-C.                                                  ef1 

12:24 Patient visited by Constance Dowd RN.                                                  mk4 

12:25 Gallbladder US Returned.                                                                EDMS

12:35 Patient visited by Jaycee Carrillo PA-C.                                                  ef1 

12:35 KERRIE Gaston is Referral Physician.                                                     ef1 

12:35 Johanna Díaz MD is Referral Physician.                                              ef1 

12:35 Maged Saleem MD is Referral Physician.                                            ef1 

12:50 Patient visited by David John.                                                       jml1

12:58 Discontinued lock intact, bleeding controlled, pressure dressing applied, No            dsf 

      redness/swelling at site. No procedures done that require assistance.                       

                                                                                             

11:20 T-Sheet-- Draft Copy was scanned into [employer] and attached to record.                   gb  

11:21 Radiology Report was scanned into [employer] and attached to record.                       gb  

                                                                                                  

Administered Medications:                                                                         

                                                                                             

10:31 Drug: NS 0.9% 1000 ml [sodium chloride 0.9 % intravenous solution] Route: IV; Rate:     mk4 

      bolus; Site: right antecubital;                                                             

12:59 Follow up: IV Status: Completed infusion; IV Intake: 1000ml                             dsf 

10:31 Drug: Ondansetron 4 mg Route: IVP; Site: right antecubital;                             mk4 

12:01 Follow up: Response: No Adverse Reaction                                                hs1 

10:32 Drug: ketorolac 30 mg [ketorolac 30 mg/mL (1 mL) injection solution (1 mL)] Route: IVP; mk4 

      Site: right antecubital;                                                                    

12:01 Follow up: Response: Pain is decreased                                                  hs1 

12:57 Drug: Tamsulosin 0.4 mg [tamsulosin 0.4 mg capsule (1 caps)] Route: PO;                 dsf 

12:57 Drug: Ondansetron ODT 4 mg [ondansetron 4 mg disintegrating tablet (1 tabs)] Route: PO; dsf 

12:57 Drug: HYDROcodone-acetaminophen 1 tabs [hydrocodone 5 mg-acetaminophen 325 mg tablet (1 dsf 

      tabs)] Route: PO;                                                                           

                                                                                                  

                                                                                                  

Intake:                                                                                           

12:59 IV: 1000.00ml; Total: 1000.00ml.                                                        dsf 

                                                                                                  

Order Results:                                                                                    

Lab Order: Amylase; SPEC'M 17 10:22                                                         

      Test: AMYLASE; Value: 52; Range: ; Units: U/L; Status: F                              

Lab Order: Basic Metabolic Profile; SPEC'M 17 10:22                                         

      Test: GLUCOSE, FASTING; Value: 92; Range: ; Units: MG/DL; Status: F                   

      Test: BLOOD UREA NITROGEN; Value: 12; Range: 7-18; Units: MG/DL; Status: F                  

      Test: CREATININE FOR GFR; Value: 1.37; Range: 0.70-1.30; Abnormal: Above high normal;       

      Units: MG/DL; Status: F                                                                     

      Test: SODIUM LEVEL; Value: 141; Range: 136-145; Units: MEQ/L; Status: F                     

      Test: POTASSIUM SERUM; Value: 3.5; Range: 3.5-5.1; Units: MEQ/L; Status: F                  

      Test: CHLORIDE LEVEL; Value: 103; Range: ; Units: MEQ/L; Status: F                    

      Test: CARBON DIOXIDE LEVEL; Value: 30; Range: 21-32; Units: MEQ/L; Status: F                

      Test: ANION GAP; Value: 8; Range: 8-16; Units: MEQ/L; Status: F                             

      Test: CALCIUM LEVEL; Value: 9.2; Range: 8.5-10.1; Units: MG/DL; Status: F                   

Lab Order: CBC with Diff; SPEC'M 17 10:22                                                   

      Test: WHITE BLOOD COUNT; Value: 7.3; Range: 4.0-10.0; Units: K/mm3; Status: F               

      Test: RED BLOOD COUNT; Value: 6.07; Range: 4.30-6.10; Units: M/mm3; Status: F               

      Test: HEMOGLOBIN; Value: 18.2; Range: 14.0-18.0; Abnormal: Above high normal; Units:        

      g/dl; Status: F                                                                             

      Test: HEMATOCRIT; Value: 50.4; Range: 42.0-52.0; Units: %; Status: F                        

      Test: MEAN CORPUSCULAR VOLUME; Value: 83.0; Range: 80.0-96.0; Units: fl; Status: F          

      Test: MEAN CORPUSCULAR HEMOGLOBIN; Value: 29.9; Range: 27.0-33.0; Units: pg; Status: F      

      Test: MEAN CORPUSCULAR HGB CONC; Value: 36.0; Range: 32.0-36.5; Units: g/dl; Status: F      

      Test: RED CELL DISTRIBUTION WIDTH; Value: 12.0; Range: 11.5-14.5; Units: %; Status: F       

      Test: PLATELET COUNT, AUTOMATED; Value: 136; Range: 150-450; Abnormal: Below low            

      normal; Units: k/mm3; Status: F                                                             

      Test: NEUTROPHILS %; Value: 74.0; Range: 36.0-66.0; Abnormal: Above high normal; Units:     

      %; Status: F                                                                                

      Test: LYMPH %; Value: 16.9; Range: 24.0-44.0; Abnormal: Below low normal; Units: %;         

      Status: F                                                                                   

      Test: MONO %; Value: 6.0; Range: 0.0-5.0; Abnormal: Above high normal; Units: %;            

      Status: F                                                                                   

      Test: EOS %; Value: 0.7; Range: 0.0-3.0; Units: %; Status: F                                

      Test: BASO %; Value: 0.4; Range: 0.0-1.0; Units: %; Status: F                               

      Test: LARGE UNSTAINED CELL %; Value: 2.0; Range: 0.0-4.0; Units: %; Status: F               

      Test: NEUTROPHILS #; Value: 5.4; Range: 1.8-7.7; Units: K/mm3; Status: F                    

      Test: LYMPH #; Value: 1.2; Range: 1.5-6.5; Abnormal: Below low normal; Units: K/mm3;        

      Status: F                                                                                   

      Test: MONO #; Value: 0.4; Range: 0.0-0.8; Units: K/mm3; Status: F                           

      Test: EOS #; Value: 0.0; Range: 0.0-0.50; Units: K/mm3; Status: F                           

      Test: BASO #; Value: 0.0; Range: 0.0-0.2; Units: K/mm3; Status: F                           

      Test: LARGE UNSTAINED CELL #; Value: 0.2; Range: 0.0-0.4; Units: K/mm3; Status: F           

Lab Order: Lipase; SPEC'M 17 10:22                                                          

      Test: LIPASE; Value: 115; Range: ; Units: U/L; Status: F                              

Lab Order: Liver Profile; SPEC'M 17 10:22                                                   

      Test: AST/SGOT; Value: 16; Range: 15-37; Units: U/L; Status: F                              

      Test: ALT/SGPT; Value: 20; Range: 12-78; Units: U/L; Status: F                              

      Test: ALKALINE PHOSPHATASE; Value: 107; Range: ; Units: U/L; Status: F                

      Test: BILIRUBIN,TOTAL; Value: 2.6; Range: 0.2-1.0; Abnormal: Above high normal; Units:      

      MG/DL; Status: F                                                                            

      Test: BILIRUBIN,DIRECT; Value: 0.3; Range: 0.0-0.2; Abnormal: Above high normal; Units:     

      MG/DL; Status: F                                                                            

      Test: TOTAL PROTEIN; Value: 8.2; Range: 6.4-8.2; Units: GM/DL; Status: F                    

      Test: ALBUMIN; Value: 4.6; Range: 3.2-5.2; Units: GM/DL; Status: F                          

      Test: ALBUMIN/GLOBULIN RATIO; Value: 1.28; Range: 1.00-1.93; Status: F                      

Lab Order: Urinalysis; SPEC'M 17 10:22                                                      

      Test: APPEARANCE, URINE; Value: CLEAR; Range: CLEAR; Status: F                              

      Test: COLOR, URINE; Value: YELLOW; Range: YELLOW; Status: F                                 

      Test: PH,URINE; Value: 6.0; Range: 5.0-9.0; Units: UNITS; Status: F                         

      Test: SPECIFIC GRAVITY URINE AUTO; Value: 1.020; Range: 1.002-1.035; Status: F              

      Test: PROTEIN, URINE AUTO; Value: NEGATIVE; Range: NEGATIVE; Units: mg/dL; Status: F        

      Test: GLUCOSE, URINE (UA) AUTO; Value: NEGATIVE; Range: NEGATIVE; Units: mg/dL; Status:     

      F                                                                                           

      Test: KETONE, URINE AUTO; Value: NEGATIVE; Range: NEGATIVE; Units: mg/dL; Status: F         

      Test: UROBILINOGEN, URINE AUTO; Value: 0.2; Range: 0.0-2.0; Units: mg/dL; Status: F         

      Test: BILIRUBIN, URINE AUTO; Value: NEGATIVE; Range: NEGATIVE; Status: F                    

      Test: NITRITE, URINE AUTO; Value: NEGATIVE; Range: NEGATIVE; Status: F                      

      Test: LEUKOCYTE ESTERASE, URINE AUTO; Value: NEGATIVE; Range: NEGATIVE; Status: F           

      Test: BLOOD, URINE BLOOD; Value: 1+; Range: NEGATIVE; Abnormal: Above high normal;          

      Status: F                                                                                   

      Test: WBC, URINE AUTO; Value: 0; Range: 0-3; Units: /HPF; Status: F                         

      Test: RBC, URINE AUTO; Value: 4; Range: 0-3; Abnormal: Above high normal; Units: /HPF;      

      Status: F                                                                                   

      Test: BACTERIA, URINE AUTO; Value: NEGATIVE; Range: NEGATIVE; Status: F                     

      Test: SQUAMOUS EPITHELIAL CELL UR AU; Value: 0; Range: 0-6; Units: /HPF; Status: F          

      Test: MUCUS, URINE; Value: SMALL; Range: NEGATIVE; Status: F                                

      Test: HYALINE CAST, URINE AUTO; Value: 0; Range: 0-1; Units: /LPF; Status: F                

Lab Order: Urine Culture; SPEC'M 17 10:22                                                   

      Test: URINE CULTURE; Value: <EXTERNAL COMMENT eCWMed> FULL REPORT IN LAB NOTES (eCW and     

      Med[facility]).; Status: F                                                                         

      Test: URINE CULTURE; Value: URINE CULTURE RESULT NO GROWTH CLINICAL SIGNIFICANCE 1          

      ORGANISM; Status: F                                                                         

                                                                                                  

Radiology Order: CT ABD & PELVIS: No Contrast                                                   

      Test: CT ABD & PELVIS: No Contrast                                                        

      REASON FOR EXAMINATION: abd pain/black stools/flank pain; Clinical: Generalized             

      abdominal pain.; ; Findings:; Lung bases clear. Visualized heart and pericardium            

      normal.; ; Liver, spleen, pancreas, gallbladder, bilateral adrenal glands and kidneys       

      are; normal for noncontrast evaluation. Evaluation of the enteric system is limited; by     

      paucity of intraperitoneal fat as well as lack of intraluminal contrast. No;                

      obstruction or obvious acute inflammatory process. Visualized portions of the; appendix     

      appear normal although incompletely evaluated. Pelvis demonstrates; normal bladder and      

      age appropriate prostate/seminal vesicles. 3 mm calcification; within the deep left         

      bhakti pelvis likely represents phlebolith and less likely; ureteral calculus. No             

      ascites. No free air. No obvious adenopathy. Abdominal; aorta without aneurysm.             

      Musculoskeletal structures intact.; ; Impression:; Limited examination.; No obvious         

      acute intra-abdominal or pelvic pathology.; 3 mm calcification in the left bhakti pelvis      

      likely phleboliths although; correlation with physical examination and urinalysis is        

      recommended to better; exclude the possibility of distal left ureteral stone.; ; ;          

      Signed by; Camacho Avalos MD 2017 11:00 A;                                            

Radiology Order: Gallbladder US                                                                   

      Test: Gallbladder US                                                                        

      REASON FOR EXAMINATION: Biliary Colic; Clinical: Biliary colic.; ; Technique: Real time     

      gray scale and color evaluation using curved array; transducer.; ; Findings:; The liver     

      and pancreas are normal in contour, size, echogenicity without focal; hepatic or            

      pancreatic lesions identified. The gallbladder is normal and without; gallstones, wall      

      thickening or pericholecystic fluid. No biliary ductal; dilatation is appreciated and       

      the common bile duct measures 2.7 mm diameter. The; right kidney is normal in reniform      

      shape with extrarenal pelvis and measures 9.7; x 3.9 x 5.2 cm. No ascites in the            

      visualized right upper quadrant.; ; Impression:; Normal RUQ abdominal ultrasound.; ; ;      

      Signed by; Camacho Avalos MD 2017 12:04 P;                                            

Outcome:                                                                                          

12:35 Discharge ordered by Provider.                                                          ef1 

12:58 Discharge Assessment: Patient awake, alert and oriented x 3. No cognitive and/or        dsf 

      functional deficits noted. Patient verbalized understanding of disposition                  

      instructions. patient administered narcotics - yes. Pt provided with safe discharge.        

      The following High Risk Discharge criteria are identified: None. Discharged to home         

      ambulatory. Condition: stable. Discharge instructions given to patient, Instructed on       

      discharge instructions, follow up and referral plans. medication usage, no driving          

      heavy equipment, Demonstrated understanding of instructions, medications, Pt was            

      receptive of discharge instructions/ teaching. Prescriptions given X 3. CT Study            

      completed. Ultrasound Study completed. Property sent home with patient.                     

12:59 Patient left the ED.                                                                    dsf 

                                                                                                  

Signatures:                                                                                       

Dispatcher [employer]                           EDDipika Otero, RN                      TRICIA   [facility]                                                  

Lelo Montoya RN RN   [facility]                                                  

Arlyn, Audrey, Reg                  Reg  gb                                                   

Jaycee Carrillo, PA-C                      PA-C ef1                                                  

Rema Browne RN                    RN   hs1                                                  

Hattie Chance RN                       RN   dsf                                                  

David John                                jml1                                                 

Jaguar Gibson                             mm15                                                 

Constance Dowd RN                      RN   [id number]                                                  

Rochelle Connolly                                  lr2                                                  

                                                                                                  

**************************************************************************************************



*** Chart Complete ***
MTDD

## 2017-02-23 NOTE — EDDOCDS
Physician Documentation                                                                           

[facility]                                                                         

Name: Josue Pang                                                                              

Age: 19 yrs                                                                                       

Sex: Male                                                                                         

: 1997                                                                                   

MRN: [id number]                                                                                     

Arrival Date: 2017                                                                          

Time: 09:28                                                                                       

Account#: [id number]                                                                              

Bed I7                                                                                        

Private MD: [place]                                                                          

Disposition:                                                                                      

17 12:35 Discharged to Home/Self Care. Impression: Calculus of kidney and ureter -          

Hematuria and Distal left ureteral stone on CT, Melena - None today, Nausea,                    

Generalized abdominal pain.                                                                     

- Condition is Stable.                                                                            

- Discharge Instructions: Nausea, Adult, Kidney Stones, Easy-to-Read, Gastrointestinal            

Bleeding, Easy-to-Read.                                                                         

- Prescriptions for Flomax 0.4 mg Oral Capsule, Sust. Release 24 hr - take 1 capsule by           

ORAL route once daily 1/2 hour following the same meal each day; 30 capsule. ZOFRAN             

ODT 4 mg - dissolve 1 tablet by ORAL route 4 times per day As needed do not chew, do            

not swallow whole; 10 tablet. Carafate 1 gram Oral Tablet - take 1 tablet by ORAL               

route 4 times per day take on an empty stomach, beginning on waking and last dose at            

bedtime; 100 tablet. Norco 5- 325 mg Oral Tablet - take 1 tablet by ORAL route every            

6 hours As needed MDD: 4 tabs; 20 tablet.                                                       

- Medication Reconciliation, Local Pharmacy Hours form.                                           

- Follow up: [facility]; When: 1 - 2 days; Reason: Recheck today's complaints,                   

Continuance of care. Follow up: Emergency Department; Reason: Worsening of                      

conditions. Follow up: Johanna Díaz; When: Call to arrange an appointment; Reason:            

Further diagnostic work-up, Recheck today's complaints, Continuance of care. Follow             

up: Dr. Maged Saleem; When: Call to arrange an appointment; Reason: Further                 

diagnostic work-up, Recheck today's complaints, Continuance of care.                            

- Problem is new.                                                                                 

- Symptoms have improved.                                                                         

                                                                                                  

                                                                                                  

                                                                                                  

Historical:                                                                                       

- Allergies: Augmentin (Rash);                                                                    

- Home Meds:                                                                                      

1. promethazine 25 mg oral tab every 6 hours as needed                                          

2. Zantac 150 mg oral cap once daily                                                            

3. Sudafed 30 mg oral tab every 4-6 hours as needed                                             

4. Tylenol 325 mg oral tab 2 tabs every 4 hours as needed                                       

5. Zofran (as hydrochloride) 4 mg Oral tab 4 mg every 8 hours as needed                         

- PMHx: GERD;                                                                                     

- PSHx: none;                                                                                     

- Social history: Smoking status: Patient states was never smoker of tobacco. No                  

barriers to communication noted, The patient speaks fluent English.                             

- Family history: Not pertinent.                                                                  

- : The pt / caregiver states he / she is not on anticoagulants. Home medication list             

is obtained from the patient.                                                                   

- Exposure Risk Screening:: None identified.                                                      

                                                                                                  

                                                                                                  

Vital Signs:                                                                                      

                                                                                             

09:30  / 90; Pulse 88; Resp 16; Temp 96.4(T); Pulse Ox 100% on R/A; Weight 63.5 kg /    lr2 

      139.99 lbs (R); Height 5 ft. 7 in. (170.18 cm) (R); Pain 5/10;                              

12:49  / 70; Pulse 91; Resp 18; Temp 97.3(O); Pulse Ox 99% on R/A; Pain 4/10;           jml1

09:30 Body Mass Index 21.93 (63.50 kg, 170.18 cm)                                             lr2 

                                                                                                  

MDM:                                                                                              

10:07 NS 0.9% 1000 ml IV at bolus once ordered.                                               ef1 

10:07 Ondansetron 4 mg IVP once ordered.                                                      ef1 

10:07 ketorolac 30 mg IVP once ordered.                                                       ef1 

10:07 IV Saline Lock ordered.                                                                 ef1 

10:07 Undress patient appropriately for examination ordered.                                  ef1 

10:08 CT ABD & PELVIS: No Contrast Ordered.                                                   EDMS

10:09 Amylase Ordered.                                                                        EDMS

10:09 Basic Metabolic Profile Ordered.                                                        EDMS

10:09 CBC with Diff Ordered.                                                                  EDMS

10:09 Lipase Ordered.                                                                         EDMS

10:09 Liver Profile Ordered.                                                                  EDMS

10:09 Urinalysis Ordered.                                                                     EDMS

10:09 Urine Culture Ordered.                                                                  EDMS

10:09 NOTHING BY MOUTH+DIET ordered.                                                          EDMS

10:29 Financial registration complete.                                                        mm15

10:46 [facility] Payment Agreement was scanned into [employer] and attached to record.               mm15

11:17 Basic Metabolic Profile Reviewed.                                                       ef1 

11:17 CBC with Diff Reviewed.                                                                 ef1 

11:17 Liver Profile Reviewed.                                                                 ef1 

11:17 Urinalysis Reviewed.                                                                    ef1 

11:17 Amylase Reviewed.                                                                       ef1 

11:17 Lipase Reviewed.                                                                        ef1 

11:20 Gallbladder US Ordered.                                                                 EDMS

12:34 Tamsulosin Extended Release 24 hour Capsule 0.4 mg PO once ordered.                     ef1 

12:41 Ondansetron ODT Oral Disintegrating Tablet 4 mg PO once ordered.                        ef1 

12:41 HYDROcodone-acetaminophen 5 mg-325 mg 1 tabs PO once ordered.                           ef1 

                                                                                             

11:20 T-Sheet-- Draft Copy was scanned into [employer] and attached to record.                   gb  

11:21 Radiology Report was scanned into [employer] and attached to record.                       gb  

                                                                                                  

Administered Medications:                                                                         

                                                                                             

10:31 Drug: NS 0.9% 1000 ml [sodium chloride 0.9 % intravenous solution] Route: IV; Rate:     mk4 

      bolus; Site: right antecubital;                                                             

12:59 Follow up: IV Status: Completed infusion; IV Intake: 1000ml                             dsf 

10:31 Drug: Ondansetron 4 mg Route: IVP; Site: right antecubital;                             mk4 

12:01 Follow up: Response: No Adverse Reaction                                                hs1 

10:32 Drug: ketorolac 30 mg [ketorolac 30 mg/mL (1 mL) injection solution (1 mL)] Route: IVP; mk4 

      Site: right antecubital;                                                                    

12:01 Follow up: Response: Pain is decreased                                                  hs1 

12:57 Drug: Tamsulosin 0.4 mg [tamsulosin 0.4 mg capsule (1 caps)] Route: PO;                 dsf 

12:57 Drug: Ondansetron ODT 4 mg [ondansetron 4 mg disintegrating tablet (1 tabs)] Route: PO; dsf 

12:57 Drug: HYDROcodone-acetaminophen 1 tabs [hydrocodone 5 mg-acetaminophen 325 mg tablet (1 dsf 

      tabs)] Route: PO;                                                                           

                                                                                                  

                                                                                                  

Signatures:                                                                                       

Dispatcher [employer]                           Dipika Ma RN                      RN   kcs                                                  

Audrey Stoddard, Reg                  Reg  gb                                                   

Jaycee Carrillo, PA-C                      PA-C ef1                                                  

Hattie Chance RN                       RN   dsf                                                  

Jaguar Gibson                             mm15                                                 

Constance Dowd RN RN   mk4                                                  

Rema Browne RN   hs1                                                  

                                                                                                  

The chart was reviewed and I authenticate all verbal orders and agree with the evaluation and 
treatment provided.Attachments:

10:46 [facility] Payment Agreement                                                                mm15

                                                                                             

11:20 T-Sheet-- Draft Copy                                                                      

                                                                                                  

**************************************************************************************************



*** Chart Complete ***
MTDD